# Patient Record
Sex: FEMALE | Race: WHITE | Employment: FULL TIME | URBAN - METROPOLITAN AREA
[De-identification: names, ages, dates, MRNs, and addresses within clinical notes are randomized per-mention and may not be internally consistent; named-entity substitution may affect disease eponyms.]

---

## 2020-10-14 ENCOUNTER — OFFICE VISIT (OUTPATIENT)
Dept: ORTHOPEDICS CLINIC | Facility: CLINIC | Age: 35
End: 2020-10-14
Payer: COMMERCIAL

## 2020-10-14 DIAGNOSIS — M25.512 ACUTE PAIN OF LEFT SHOULDER: Primary | ICD-10-CM

## 2020-10-14 DIAGNOSIS — M62.81 MUSCLE WEAKNESS OF LEFT ARM: ICD-10-CM

## 2020-10-14 PROCEDURE — 99243 OFF/OP CNSLTJ NEW/EST LOW 30: CPT | Performed by: ORTHOPAEDIC SURGERY

## 2020-10-14 RX ORDER — CHLORAL HYDRATE 500 MG
CAPSULE ORAL
COMMUNITY

## 2020-10-14 RX ORDER — MELOXICAM 15 MG/1
TABLET ORAL
COMMUNITY
Start: 2020-09-28

## 2020-10-14 NOTE — PROGRESS NOTES
[facility] New Patient Note    CC: Patient presents with:  Arm or Hand Injury: Left arm pain, started 4 weeks ago after getting a flu vaccine. Patient had flu vaccine administered at PCP office Dr. Angelo Montero.        HPI: This 38year old female pres this visit. On examination the patient is a healthy-appearing 57-year-old female in no distress. No obvious deformity discoloration or swelling is noted about the left shoulder girdle although there may be some mild deltoid atrophy.   She is globally sens MD  [address] [place] [address] Surgery    The dictation was partially prepared using [employer] voice recognition software. Errors may occur, which have been corrected when identified.  Although every attempt is made to correct errors during dictation, reemaa

## (undated) NOTE — LETTER
10/14/20    Dear Lio Garrison:     I had the opportunity to see your patient Pilar Goel at my office, thank you for referring her to me for a surgical consult. I have enclosed my office visit consult note for your review.  Please contact my office if anything fur Smoking status: Never Smoker      Smokeless tobacco: Never Used    Substance and Sexual Activity      Alcohol use: No      Drug use: No      Sexual activity: Not on file       ROS:  Complete ROS reviewed by me and non-contributory to the chief complain Plan: I reviewed history and exam findings with the patient and confessed that I have no clear explanation for her pain and weakness.   She distinctly correlates her onset with her flu shot and although it is unlikely there is always a possibility that the